# Patient Record
Sex: MALE | Race: WHITE
[De-identification: names, ages, dates, MRNs, and addresses within clinical notes are randomized per-mention and may not be internally consistent; named-entity substitution may affect disease eponyms.]

---

## 2018-03-10 ENCOUNTER — HOSPITAL ENCOUNTER (EMERGENCY)
Dept: HOSPITAL 39 - ER | Age: 53
Discharge: HOME | End: 2018-03-10
Payer: MEDICARE

## 2018-03-10 VITALS — OXYGEN SATURATION: 96 %

## 2018-03-10 VITALS — TEMPERATURE: 98.6 F | DIASTOLIC BLOOD PRESSURE: 79 MMHG | SYSTOLIC BLOOD PRESSURE: 115 MMHG

## 2018-03-10 DIAGNOSIS — S60.424A: Primary | ICD-10-CM
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
## 2019-01-12 ENCOUNTER — HOSPITAL ENCOUNTER (INPATIENT)
Dept: HOSPITAL 39 - ER | Age: 54
LOS: 4 days | Discharge: HOME | DRG: 299 | End: 2019-01-16
Attending: NURSE PRACTITIONER | Admitting: NURSE PRACTITIONER
Payer: COMMERCIAL

## 2019-01-12 DIAGNOSIS — Z88.5: ICD-10-CM

## 2019-01-12 DIAGNOSIS — I82.441: Primary | ICD-10-CM

## 2019-01-12 DIAGNOSIS — I82.811: ICD-10-CM

## 2019-01-12 DIAGNOSIS — L03.115: ICD-10-CM

## 2019-01-12 DIAGNOSIS — Z79.01: ICD-10-CM

## 2019-01-12 DIAGNOSIS — Z86.711: ICD-10-CM

## 2019-01-12 DIAGNOSIS — I26.99: ICD-10-CM

## 2019-01-12 DIAGNOSIS — Z86.73: ICD-10-CM

## 2019-01-12 DIAGNOSIS — E11.621: ICD-10-CM

## 2019-01-12 DIAGNOSIS — D68.59: ICD-10-CM

## 2019-01-12 DIAGNOSIS — I82.411: ICD-10-CM

## 2019-01-12 DIAGNOSIS — I69.844: ICD-10-CM

## 2019-01-12 NOTE — ED.PDOC
History of Present Illness





- General


Chief Complaint: Skin/Abrasion/Tear


Stated Complaint: open sore to Rt foot


Time Seen by Provider: 01/12/19 22:24


Source: patient


Exam Limitations: no limitations





- History of Present Illness


Initial Comments: 


patient comes in with diabetic foot ulcer.  Patient has 3 days ago that his 

right leg started swelling.  Since then patient has had a small circular lesion 

on the right upper portion of his foot that has become an ulcer.  Patient denies

any fever, chills, nausea or vomiting.  The lesion does hurt and has been 

worsening with pain on his entire right side. He's never had this before.  He is

on blood thinner as he had pulmonary embolus and intra-abdominal clots in the 

past.  Patient states his past medical history is significant for CVA, diabetes,

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





Timing/Duration: getting worse


Severity: moderate


Improving Factors: nothing


Worsening Factors: nothing


Associated Symptoms: denies symptoms


Allergies/Adverse Reactions: 


Allergies





Codeine Adverse Reaction (Mild, Verified 03/10/18 02:45)


   Rash








Home Medications: 


Ambulatory Orders





Insulin Degludec [Tresiba Flextouch] See Protocol SC DAILY 01/12/19 


Sertraline HCl [Zoloft] 25 mg PO DAILY 01/12/19 











Review of Systems





- Review of Systems


Constitutional: States: no symptoms reported, malaise.  Denies: chills, fever


EENTM: States: no symptoms reported.  Denies: eye pain, ear pain, nose pain, 

nose congestion


Respiratory: States: no symptoms reported.  Denies: cough, short of breath


Cardiology: States: no symptoms reported.  Denies: chest pain, edema, 

palpitations


Gastrointestinal/Abdominal: States: no symptoms reported.  Denies: abdominal 

pain, diarrhea, nausea, vomiting


Skin: States: see HPI





Past Medical History (General)





- Patient Medical History


Hx Stroke: Yes - TIA's x 2


Hx Cardiac Disorders: Yes - Hx PE's


Hx Congestive Heart Failure: No


Hx Diabetes: Yes


Hx Cancer: Yes - Salivary gland


Hx MRSA: No


Surgical History: cancer surgery, other





- Vaccination History


Hx Tetanus, Diphtheria Vaccination: Yes


Hx Influenza Vaccination: No


Hx Pneumococcal Vaccination: No





- Social History


Hx Tobacco Use: No


Hx Alcohol Use: No


Hx Substance Use: No


Hx Physical Abuse: No


Hx Emotional Abuse: No


Hx Suspected Abuse: No





- Triage Comment


ED Triage Comment: Pt report he noticed a blistered sore to top of his Rt foot, 

came up yesterday. however his Rt lower leg has beomce swollen over last 3-

4days. Pt puntured the blister himself. States it itched and burned. Pt is 

diabetic.





Family Medical History





- Family History


  ** Mother


Family History: Unknown


Living Status: Still Living





Physical Exam





- Physical Exam


General Appearance: Alert, No apparent distress


Eye Exam: bilateral normal


Ears, Nose, Throat: hearing grossly normal, normal ENT inspection, normal 

pharynx


Neck: non-tender, full range of motion, supple, normal inspection


Respiratory: chest non-tender, lungs clear, normal breath sounds, no respiratory

distress


Cardiovascular/Chest: normal peripheral pulses, regular rate, rhythm, no edema, 

no gallop, no murmur


Gastrointestinal/Abdominal: normal bowel sounds, non tender, soft


Extremity: swelling - swelling and erythema of right lower extremity to the 

knee, 1 cm well defined circular ulcer on superior foot with no purulent 

drainage and no fluctulance but positive calor


Neurologic: no motor/sensory deficits, alert, oriented x 3





Progress





- Progress


Progress: 





01/12/19 23:31


I have concern for this being an ulceration from arterial insufficiency.  His 

pulse is diminished and the entire leg is very swollen.  He is on Eliquis but he

has history of recurrent clots and known vascular disease. called for transfer 

and cardiology requested CTA, 


01/13/19 01:06


CTA shows no occlusion, stenosis, will admit here for IV antibiotics.  called NP

Ahmet Estrada and will accept





- Results/Orders


Results/Orders: 





                               Laboratory Results











WBC  7.9 K/mm3 (4.8-10.8)   01/12/19  22:50    


 


RBC  5.27 M/mm3 (4.70-6.10)   01/12/19  22:50    


 


Hgb  15.3 gm/dL (14.0-18.0)   01/12/19  22:50    


 


Hct  45.4 % (42.0-52.0)   01/12/19  22:50    


 


MCV  86.2 fl (80.0-94.0)   01/12/19  22:50    


 


MCH  29.1 pg (27.0-31.0)   01/12/19  22:50    


 


MCHC  33.8 g/dL (33.0-37.0)   01/12/19  22:50    


 


RDW  13.4 % (11.5-14.5)   01/12/19  22:50    


 


Plt Count  205 K/mm3 (130-400)   01/12/19  22:50    


 


MPV  7.7 fl (7.40-10.4)   01/12/19  22:50    


 


Absolute Neuts (auto)  4.10 K/uL (1.8-6.8)   01/12/19  22:50    


 


Absolute Lymphs (auto)  2.70 K/uL (1.0-3.4)   01/12/19  22:50    


 


Absolute Monos (auto)  0.60 K/uL (0.2-0.8)   01/12/19  22:50    


 


Absolute Eos (auto)  0.40 K/uL (0.0-0.4)   01/12/19  22:50    


 


Absolute Basos (auto)  0.10 K/uL (0.0-0.1)   01/12/19  22:50    


 


Neutrophils %  52.2 % (42.0-78.0)   01/12/19  22:50    


 


Lymphocytes %  34.2 % (20.0-50.0)   01/12/19  22:50    


 


Monocytes %  7.0 % (2.0-9.0)   01/12/19  22:50    


 


Eosinophils %  5.6 % (1.0-5.0)  H  01/12/19  22:50    


 


Basophils %  1.0 % (0.0-2.0)   01/12/19  22:50    


 


Sodium  138 mmol/L (135-145)   01/12/19  22:50    


 


Potassium  3.8 mmol/L (3.6-5.0)   01/12/19  22:50    


 


Chloride  104 mmol/L (101-111)   01/12/19  22:50    


 


Carbon Dioxide  26 mmol/L (21-31)   01/12/19  22:50    


 


Anion Gap  11.8  (12-18)  L  01/12/19  22:50    


 


BUN  21 mg/dL (7-18)  H  01/12/19  22:50    


 


Creatinine  0.93 mg/dL (0.6-1.3)   01/12/19  22:50    


 


BUN/Creatinine Ratio  22.6  (10-20)  H  01/12/19  22:50    


 


Random Glucose  380 mg/dL ()  H  01/12/19  22:50    


 


Serum Osmolality  294.3 mOsm/L (275-295)   01/12/19  22:50    


 


Lactic Acid  1.6 mmol/L (0.5-2.2)   01/12/19  11:00    


 


Calcium  9.1 mg/dL (8.4-10.2)   01/12/19  22:50    


 


Total Bilirubin  0.6 mg/dL (0.2-1.0)   01/12/19  22:50    


 


AST  26 IU/L (10-42)   01/12/19  22:50    


 


ALT  30 IU/L (10-60)   01/12/19  22:50    


 


Alkaline Phosphatase  131 IU/L ()  H  01/12/19  22:50    


 


Serum Total Protein  7.1 gm/dL (6.4-8.2)   01/12/19  22:50    


 


Albumin  3.4 g/dl (3.2-5.5)   01/12/19  22:50    


 


Globulin  3.7 gm/dL (2.3-3.5)  H  01/12/19  22:50    


 


Albumin/Globulin Ratio  0.9  (1.1-1.9)  L  01/12/19  22:50    








Patient Name: CLARENCE TOWNSEND


Patient ID: [id number]


Gender: Male


YOB: 1965


Home Phone: [phone]


Referring Physician: VANESA SOLANO


Organization: [facility]


Accession Number: [id number]


Requested Date: January 12, 2019 23:43


Report Status: Final


Requested Procedure: 1


Procedure Description: CTA Lower Extremity


Modality: CT


Findings


Reporting MD: Rolando Deal


Fellow MD: Not available


Dictation Time:


: Not available


Transcription Date:


EXAM DESCRIPTION:


1. CTA of the right lower extremity with contrast.


CLINICAL HISTORY: Swelling, ulcer, arterial insufficiency


COMPARISON: None Available.


TECHNIQUE: Axial CTA images of the right lower extremity obtained


with IV contrast. MIP/third reformatted images available.


DLP: 1714.26 mGycm


FINDINGS:


CTA: The right lower extremity arterial system is visualized from


the infrarenal abdominal aorta through the foot. The visualized


portions of the abdominal aorta have normal caliber. The origin


of the inferior mesenteric artery is widely patent. There is


in-line flow from the infrarenal abdominal aorta into the common


and external iliac arteries bilaterally. The internal iliac


arteries are patent. There is in-line flow from the right common


femoral through the superficial femoral and popliteal arteries.


There is three-vessel runoff to the right foot. The profunda


femoral artery is patent. No evidence of flow-limiting stenosis,


dissection, or occlusion in the right lower extremity.


Bones: No acute osseous abnormality identified. Postoperative


change of the knee. No acute fracture.


Soft tissues: No abnormalities in the visualized portions of the


abdominal or pelvic soft tissues. Specifically, no dilated loops


of visualized large or small bowel. Normal appendix. No


abnormalities of the visualized portions of the kidney or liver.


Urinary bladder and prostate are unremarkable. No lymphadenopathy


or free pelvic fluid identified. No definite free intraperitoneal


air.


There is edema throughout the subcutaneous soft tissues of the


right leg extending to the foot. No subcutaneous air noted.


IMPRESSION:


[facility]


[address] [place] [address], [address] [place], [place]


T [phone] F [phone]


[email or web address] - Report exported on Sun, Jan 13, 2019 01:03:02 -0600 

- Page 2 of 2


1. No evidence of stenosis, occlusion, or dissection in the right


lower extremity arterial system.


2. Diffuse edema throughout the subcutaneous soft tissues of the


right lower extremity extending to the foot. Correlation with


venous duplex ultrasound of the right lower extremity may be


helpful.


This exam was performed according to our departmental


dose-optimization program, which includes automated exposure


control, adjustment of the mA and/or kV according to patient size


and/or use of iterative reconstruction technique





Departure





- Departure


Clinical Impression: 


Cellulitis


Qualifiers:


 Site of cellulitis: extremity Site of cellulitis of extremity: lower extremity 

Laterality: right Qualified Code(s): L03.115 - Cellulitis of right lower limb





Disposition: Admit Patient


Condition: Fair


Departure Forms:  ED Discharge - Pt. Copy, Patient Portal Self Enrollment


Instructions:  DI for Abrasion


Home Medications: 


Ambulatory Orders





Insulin Degludec [Tresiba Flextouch] See Protocol SC DAILY 01/12/19 


Sertraline HCl [Zoloft] 25 mg PO DAILY 01/12/19 











Decision To Admit





- Decistion To Admit


Decision to Admit Reason: Admit from ER


Decision to Admit Date: 01/13/19


Decision to Admit Time: 01:08

## 2019-01-13 RX ADMIN — SODIUM CHLORIDE AND POTASSIUM CHLORIDE PRN MLS/HR: 4.5; 1.49 INJECTION, SOLUTION INTRAVENOUS at 03:36

## 2019-01-13 RX ADMIN — INSULIN DETEMIR SCH UNITS: 100 INJECTION, SOLUTION SUBCUTANEOUS at 22:57

## 2019-01-13 RX ADMIN — VANCOMYCIN HYDROCHLORIDE SCH MLS/HR: 500 INJECTION, POWDER, LYOPHILIZED, FOR SOLUTION INTRAVENOUS at 09:21

## 2019-01-13 RX ADMIN — ONDANSETRON PRN MG: 2 INJECTION INTRAMUSCULAR; INTRAVENOUS at 03:10

## 2019-01-13 RX ADMIN — INSULIN LISPRO SCH UNITS: 100 INJECTION, SOLUTION INTRAVENOUS; SUBCUTANEOUS at 11:49

## 2019-01-13 RX ADMIN — VANCOMYCIN HYDROCHLORIDE SCH MLS/HR: 500 INJECTION, POWDER, LYOPHILIZED, FOR SOLUTION INTRAVENOUS at 17:22

## 2019-01-13 RX ADMIN — MEROPENEM SCH MLS/HR: 1 INJECTION, POWDER, FOR SOLUTION INTRAVENOUS at 06:17

## 2019-01-13 RX ADMIN — INSULIN LISPRO SCH UNITS: 100 INJECTION, SOLUTION INTRAVENOUS; SUBCUTANEOUS at 17:24

## 2019-01-13 RX ADMIN — CEFTRIAXONE SCH MLS/HR: 1 INJECTION, POWDER, FOR SOLUTION INTRAMUSCULAR; INTRAVENOUS at 21:32

## 2019-01-13 RX ADMIN — APIXABAN SCH MG: 2.5 TABLET, FILM COATED ORAL at 21:07

## 2019-01-13 RX ADMIN — INSULIN LISPRO SCH UNITS: 100 INJECTION, SOLUTION INTRAVENOUS; SUBCUTANEOUS at 17:23

## 2019-01-13 RX ADMIN — SODIUM CHLORIDE AND POTASSIUM CHLORIDE PRN MLS/HR: 4.5; 1.49 INJECTION, SOLUTION INTRAVENOUS at 14:15

## 2019-01-13 RX ADMIN — INSULIN LISPRO SCH UNITS: 100 INJECTION, SOLUTION INTRAVENOUS; SUBCUTANEOUS at 21:54

## 2019-01-13 RX ADMIN — INSULIN LISPRO SCH UNITS: 100 INJECTION, SOLUTION INTRAVENOUS; SUBCUTANEOUS at 07:11

## 2019-01-13 RX ADMIN — MEROPENEM SCH MLS/HR: 1 INJECTION, POWDER, FOR SOLUTION INTRAVENOUS at 14:14

## 2019-01-13 RX ADMIN — ONDANSETRON PRN MG: 2 INJECTION INTRAMUSCULAR; INTRAVENOUS at 07:37

## 2019-01-13 RX ADMIN — ACETAMINOPHEN PRN MG: 325 TABLET ORAL at 07:18

## 2019-01-13 RX ADMIN — INSULIN LISPRO SCH UNITS: 100 INJECTION, SOLUTION INTRAVENOUS; SUBCUTANEOUS at 11:50

## 2019-01-13 NOTE — CT
EXAM DESCRIPTION:

1. CTA of the right lower extremity with contrast.



CLINICAL HISTORY: Swelling, ulcer, arterial insufficiency



COMPARISON: None Available.



TECHNIQUE: Axial CTA images of the right lower extremity obtained

with IV contrast. MIP/third reformatted images available.



DLP: 1714.26 mGycm



FINDINGS: 



CTA: The right lower extremity arterial system is visualized from

the infrarenal abdominal aorta through the foot. The visualized

portions of the abdominal aorta have normal caliber. The origin

of the inferior mesenteric artery is widely patent. There is

in-line flow from the infrarenal abdominal aorta into the common

and external iliac arteries bilaterally. The internal iliac

arteries are patent. There is in-line flow from the right common

femoral through the superficial femoral and popliteal arteries.

There is three-vessel runoff to the right foot. The profunda

femoral artery is patent. No evidence of flow-limiting stenosis,

dissection, or occlusion in the right lower extremity.



Bones: No acute osseous abnormality identified. Postoperative

change of the knee. No acute fracture.



Soft tissues: No abnormalities in the visualized portions of the

abdominal or pelvic soft tissues. Specifically, no dilated loops

of visualized large or small bowel. Normal appendix. No

abnormalities of the visualized portions of the kidney or liver.

Urinary bladder and prostate are unremarkable. No lymphadenopathy

or free pelvic fluid identified. No definite free intraperitoneal

air.



There is edema throughout the subcutaneous soft tissues of the

right leg extending to the foot. No subcutaneous air noted.







IMPRESSION: 



1. No evidence of stenosis, occlusion, or dissection in the right

lower extremity arterial system.



2. Diffuse edema throughout the subcutaneous soft tissues of the

right lower extremity extending to the foot. Correlation with

venous duplex ultrasound of the right lower extremity may be

helpful.



This exam was performed according to our departmental

dose-optimization program, which includes automated exposure

control, adjustment of the mA and/or kV according to patient size

and/or use of iterative reconstruction technique.



Electronically signed by:  Rolando Deal  1/13/2019 12:57

AM CST Workstation: [id number]
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
## 2019-01-14 RX ADMIN — INSULIN LISPRO SCH UNITS: 100 INJECTION, SOLUTION INTRAVENOUS; SUBCUTANEOUS at 07:50

## 2019-01-14 RX ADMIN — APIXABAN SCH MG: 2.5 TABLET, FILM COATED ORAL at 20:55

## 2019-01-14 RX ADMIN — ACETAMINOPHEN PRN MG: 325 TABLET ORAL at 22:24

## 2019-01-14 RX ADMIN — CEFTRIAXONE SCH MLS/HR: 1 INJECTION, POWDER, FOR SOLUTION INTRAMUSCULAR; INTRAVENOUS at 21:48

## 2019-01-14 RX ADMIN — INSULIN DETEMIR SCH UNITS: 100 INJECTION, SOLUTION SUBCUTANEOUS at 09:06

## 2019-01-14 RX ADMIN — VANCOMYCIN HYDROCHLORIDE SCH MLS/HR: 500 INJECTION, POWDER, LYOPHILIZED, FOR SOLUTION INTRAVENOUS at 02:33

## 2019-01-14 RX ADMIN — INSULIN LISPRO SCH UNITS: 100 INJECTION, SOLUTION INTRAVENOUS; SUBCUTANEOUS at 07:51

## 2019-01-14 RX ADMIN — Medication PRN ML: at 21:48

## 2019-01-14 RX ADMIN — INSULIN LISPRO SCH UNITS: 100 INJECTION, SOLUTION INTRAVENOUS; SUBCUTANEOUS at 12:22

## 2019-01-14 RX ADMIN — SERTRALINE HYDROCHLORIDE SCH MG: 50 TABLET ORAL at 09:08

## 2019-01-14 RX ADMIN — VANCOMYCIN HYDROCHLORIDE SCH MLS/HR: 500 INJECTION, POWDER, LYOPHILIZED, FOR SOLUTION INTRAVENOUS at 09:08

## 2019-01-14 RX ADMIN — VANCOMYCIN HYDROCHLORIDE SCH MLS/HR: 500 INJECTION, POWDER, LYOPHILIZED, FOR SOLUTION INTRAVENOUS at 17:34

## 2019-01-14 RX ADMIN — INSULIN LISPRO SCH UNITS: 100 INJECTION, SOLUTION INTRAVENOUS; SUBCUTANEOUS at 17:34

## 2019-01-14 RX ADMIN — INSULIN DETEMIR SCH UNITS: 100 INJECTION, SOLUTION SUBCUTANEOUS at 20:56

## 2019-01-14 RX ADMIN — INSULIN LISPRO SCH UNITS: 100 INJECTION, SOLUTION INTRAVENOUS; SUBCUTANEOUS at 12:23

## 2019-01-14 RX ADMIN — APIXABAN SCH MG: 2.5 TABLET, FILM COATED ORAL at 09:08

## 2019-01-14 RX ADMIN — INSULIN LISPRO SCH UNITS: 100 INJECTION, SOLUTION INTRAVENOUS; SUBCUTANEOUS at 20:56

## 2019-01-14 NOTE — US
EXAM DESCRIPTION: 

Venous,Lower Extremity LT: ULTRASOUND.



CLINICAL HISTORY: 

Hx Protein C def; Edema to entire RLE



COMPARISON: 

Screening examination of right lower extremity on the same visit.



TECHNIQUE: 

Gray-scale and doppler sonographic evaluation of the deep venous

system of the left lower extremity.



FINDINGS: 

Doppler evaluation shows normal color flow and normal phasicity

and augmentation of the left common femoral vein, deep femoral

vein, femoral vein, popliteal vein, greater saphenous vein,

peroneal, and posterior tibial vein. The left lower extremity

deep veins were completely compressible; normal occlusion with

transducer pressure. Gray-scale survey showed no echogenic

thrombus within these veins. 



IMPRESSION: 

1.  Duplex ultrasound evaluation of the left lower extremity deep

venous system showing no evidence of thrombosis.

2.  The sonographer, BOSTON Joshi, notified Mr. Ahmet Estrada, family nurse practitioner and hospitalist, of these

findings at approximately 1115 hours today.



Electronically signed by:  Wilder Spangler MD  1/14/2019 12:39 PM

CST Workstation: [id number]

## 2019-01-14 NOTE — CT
EXAM: CTA Chest



CLINICAL INDICATION: 53-year-old male with large DVT in the RIGHT

leg. History of pulmonary embolus.



COMPARISON: None.



EXAMINATION: CT angiography of the pulmonary arteries was

performed following intravenous administration of contrast.

Coronal and bilateral oblique maximum intensity projections

(MIPS) were created. This exam was performed according to our

departmental dose optimization program which includes use of

automated exposure control, adjustment of the mA and/or kV

according to patient size and/or use of iterative reconstruction

technique.



FINDINGS:



Chest:



Evaluation through the lungs reveals no focal opacity, pleural

effusion or pneumothorax. There is minimal dependent basilar

atelectasis and scarring. The tracheobronchial airways are

patent. No significant mediastinal or axillary lymphadenopathy by

CT measurement criteria.



Limited evaluation of the upper abdomen shows no acute

intra-abdominal abnormalities. 



The osseous structures are within normal limits.



CT angiography: Diagnostic CT angiography of the pulmonary

arteries with intraluminal filling defects compatible with

suggest pulmonary arterial emboli present within the posterior

bilateral subsegmental descending pulmonary arteries.



IMPRESSION:



1. Diagnostic pulmonary angiography with findings compatible with

bilateral pulmonary arterial emboli.

2. The lungs are clear without focal opacity, pleural effusion or

pneumothorax.



Electronically signed by:  Elaine Soto MD  1/14/2019 7:07 PM CST

Workstation: [id number]

## 2019-01-14 NOTE — US
EXAM DESCRIPTION: 

Venous,Lower Extremity RT: ULTRASOUND.



CLINICAL HISTORY: 

Hx Protein C def; Edema to entire RLE



COMPARISON: 

Self-exam examination of left lower extremity on this visit.



TECHNIQUE: 

Gray-scale and doppler sonographic evaluation of the deep venous

system of the right lower extremity.



FINDINGS: 

Echogenic thrombus involving most of the right common femoral

vein, deep femoral vein, femoral vein, popliteal vein, and

greater saphenous vein,. Also right peroneal vein and right

posterior tibial vein. Minimal to no color flow or venous

waveform. These veins are not compressible with transducer

pressure. Lesser saphenous vein below the knee is patent. 



IMPRESSION: 

1.  Duplex ultrasound evaluation of the right lower extremity

deep venous system showing extensive thrombosis. From the right

posterior tibial vein to the proximal right common femoral vein.

2.  No thrombus in the lesser saphenous vein below the knee,

which is a superficial vein. Thrombus in the greater saphenous

vein above-the-knee, which is a superficial vein.

3.  The sonographer, BOSTON Joshi, notified Mr. Ahmet Estrada, family nurse practitioner and hospitalist, of these

findings at approximately 1115 hours today.



Electronically signed by:  Wilder Spangler MD  1/14/2019 12:35 PM

CST Workstation: [id number]

## 2019-01-14 NOTE — PN
DATE:  01/14/19



SUPERVISING PHYSICIAN:  Mikel Metzger M.D.



SUBJECTIVE:  The patient notes that he feels like his leg is much less swollen 
with it being elevated.  He said the pain is improving significantly.  He has 
had no shortness of breath.  No nausea or vomiting.  No chest pains.  I did talk
with him that finally I was able to touch base with his oncologist, Dr. Montes in 
[place].  Dr. Montes recommended that we continue with Eliquis and once the 
patient can discharge he will followup this week in his office.



OBJECTIVE:  VITAL SIGNS: Temperature 98, pulse 79, blood pressure 150/87, 
respirations 18, satting 95% on room air.  Weight is not accurate at all.  He 
has had 2 bowel movements.  GENERAL: The patient appears to be alert in no acute
distress with his leg elevated on a pillow.  CHEST: Lungs remain clear to 
auscultation.  HEART: Regular rate and rhythm.  ABDOMEN: Soft, non-tender.  
Positive bowel sounds.  EXTREMITIES: Right leg continues to be swollen but it is
significantly decreasing.  There still continues to be redness to the dime-sized
sore on top of his foot but it is showing good improvement and response to 
therapy.  Pulses are Dopplerable and palpable at 1+ bilaterally.  NEUROLOGIC: He
is alert and oriented times three.  No reported paresthesias.  



RADIOLOGY:  He had Doppler studies of both lower extremities.  The Doppler of 
the right leg per radiology interpretation showed a duplex ultrasound evaluation
of the right lower extremity deep venous system showing extensive thrombosis 
with a right posterior tibial vein and proximal right common femoral vein.  
There is no thrombus in the lesser saphenous vein below the knee with was a 
superficial vein.  Thrombus in the greater saphenous vein above the knee, which 
is a superficial vein.  There was no deep venous thrombus seen on the left leg.



ASSESSMENT: 

1.   Cellulitis of the right lower leg from the thigh to the toes due to an 
extensive thrombus

      as noted on ultrasound with the patient currently on Eliquis.

2.   Diabetic ulcer to the superior portion of the foot showing good improvement
with

      antibiotics.

3.   History of protein C deficiency contributing to his DVT on #1 with the 
patient on 

      chronic Eliquis therapy but most likely has not been compliant with that 
therapy.

4.   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

      within the last 10 years secondary to protein C deficiency.

5.   Diabetes mellitus type 2 on insulin.

6.   History of transient ischemic attacks in 2011 in November and December

      with reported left sided leg weakness.



PLAN:  I did talk with Dr. Montes today.  He recommended continue with Eliquis and 
once the patient is able to discharge, to followup with his office this week.  
He is now on Eliquis 10 mg twice daily.  He continues on vancomycin and now is 
on Rocephin, and is showing good response to that treatment.  Will continue to 
monitor and treat as needed until we can transition to outpatient management.  
Until then, will continue with current therapy.



#[id number]

[facility]

## 2019-01-15 RX ADMIN — INSULIN LISPRO SCH UNITS: 100 INJECTION, SOLUTION INTRAVENOUS; SUBCUTANEOUS at 08:22

## 2019-01-15 RX ADMIN — Medication PRN ML: at 00:59

## 2019-01-15 RX ADMIN — GENTAMICIN SULFATE SCH APPLIC: 1 CREAM TOPICAL at 16:48

## 2019-01-15 RX ADMIN — APIXABAN SCH MG: 2.5 TABLET, FILM COATED ORAL at 20:27

## 2019-01-15 RX ADMIN — INSULIN LISPRO SCH UNITS: 100 INJECTION, SOLUTION INTRAVENOUS; SUBCUTANEOUS at 16:46

## 2019-01-15 RX ADMIN — ALUMINUM ZIRCONIUM TRICHLOROHYDREX GLY SCH MG: 0.2 STICK TOPICAL at 09:22

## 2019-01-15 RX ADMIN — SERTRALINE HYDROCHLORIDE SCH MG: 50 TABLET ORAL at 08:35

## 2019-01-15 RX ADMIN — APIXABAN SCH MG: 2.5 TABLET, FILM COATED ORAL at 08:36

## 2019-01-15 RX ADMIN — CEFTRIAXONE SCH MLS/HR: 1 INJECTION, POWDER, FOR SOLUTION INTRAMUSCULAR; INTRAVENOUS at 20:29

## 2019-01-15 RX ADMIN — VANCOMYCIN HYDROCHLORIDE SCH MLS/HR: 500 INJECTION, POWDER, LYOPHILIZED, FOR SOLUTION INTRAVENOUS at 08:37

## 2019-01-15 RX ADMIN — INSULIN LISPRO SCH UNITS: 100 INJECTION, SOLUTION INTRAVENOUS; SUBCUTANEOUS at 11:43

## 2019-01-15 RX ADMIN — GENTAMICIN SULFATE SCH APPLIC: 1 CREAM TOPICAL at 20:28

## 2019-01-15 RX ADMIN — INSULIN LISPRO SCH UNITS: 100 INJECTION, SOLUTION INTRAVENOUS; SUBCUTANEOUS at 11:44

## 2019-01-15 RX ADMIN — INSULIN DETEMIR SCH UNITS: 100 INJECTION, SOLUTION SUBCUTANEOUS at 21:00

## 2019-01-15 RX ADMIN — VANCOMYCIN HYDROCHLORIDE SCH MLS/HR: 500 INJECTION, POWDER, LYOPHILIZED, FOR SOLUTION INTRAVENOUS at 17:03

## 2019-01-15 RX ADMIN — INSULIN LISPRO SCH UNITS: 100 INJECTION, SOLUTION INTRAVENOUS; SUBCUTANEOUS at 21:00

## 2019-01-15 RX ADMIN — INSULIN LISPRO SCH UNITS: 100 INJECTION, SOLUTION INTRAVENOUS; SUBCUTANEOUS at 08:21

## 2019-01-15 RX ADMIN — VANCOMYCIN HYDROCHLORIDE SCH MLS/HR: 500 INJECTION, POWDER, LYOPHILIZED, FOR SOLUTION INTRAVENOUS at 00:58

## 2019-01-15 RX ADMIN — INSULIN DETEMIR SCH UNITS: 100 INJECTION, SOLUTION SUBCUTANEOUS at 08:23

## 2019-01-15 RX ADMIN — ACETAMINOPHEN PRN MG: 325 TABLET ORAL at 07:41

## 2019-01-15 NOTE — PN
DATE:  01/15/19



SUPERVISING PHYSICIAN:  Mikel Metzger M.D.



SUBJECTIVE:  The patient continues to keep his leg elevated.  Notes that the 
pain is much less than it was on admission and actually the size has decreased 
significantly.  He has had some nausea associated with morphine.  I told him we 
would try some Toradol.  Other than the small headache he has had no complaints 
of shortness of breath, chest pains or any other complaints.  I did discuss the 
finding of the CT of the chest that he did again have pulmonary embolisms 
bilaterally.  



OBJECTIVE:  VITAL SIGNS: Temperature 98.1, pulse 69, blood pressure 127/81, 
respirations 18, satting 92% on room air at rest.  I's and O's show a negative 
balance of 850 with 2100 in, 2950 out.  Weight is 118.8 kg.  GENERAL: The 
patient is resting in bed with his right leg elevated.  Appears to be 
comfortable, in no acute distress.  He is alert.  CHEST: Lungs were clear to 
auscultation.  HEART: Regular rate and rhythm.  ABDOMEN: Soft, non-tender.  
Positive bowel sounds.  EXTREMITIES: Right leg continues to show edema from the 
peroneal area to the distal foot, but significantly decreased in size and 
diameter and there is no notable redness today.  The leg is much less painful to
palpation.  The ulceration or blister that is on top of the foot is now scabbed 
over and is showing to be healing well with minimal erythema.  No drainage.  
Pulse continues to be palpable and is marked with Doppler study.  Left leg 
remains unremarkable with good pulses.  NEUROLOGIC: He is alert and oriented 
times three.



LABORATORY:  Chemistries continue to show to be within normal limits with a BUN 
of 12, creatinine 0.73.  Blood sugars have been in between 151 and 306.  C 
reactive protein is now down to 0.7 from 1.3 on admission and calcium is 8.8.  
He had 1 trough vancomycin at 11.8 on the 14th.



MICROBIOLOGY:  Wound culture at 24 hours showed no growth.  Blood cultures 
remain negative at 48 hours.



RADIOLOGY:  No additional radiographic studies other than the CT of the chest 
that was done late yesterday afternoon that showed diabetic pulmonary 
angiography with findings compatible with bilateral pulmonary arterial emboli.  
Lungs were chest without any focal opacities, pleural effusion.  No 
pneumothorax.  The pulmonary arteries with the intraluminal filling defects are 
compatible again with suggested pulmonary arterial emboli present within the 
posterior bilateral subsegmental descending pulmonary arteries.  



ASSESSMENT: 

1.   Cellulitis of the right leg from the thigh to the foot with an extensive 
thrombus

      noted on ultrasound and the patient on Eliquis therapy with the patient 
having a

      history of past DVTs and a protein C deficiency.

2.   Bilateral pulmonary arterial emboli present within the posterior bilateral 
subsegmental

      descending pulmonary arteries with the patient being hemodynamically 
stable and

      without any chest pains or shortness of breath, and currently on Eliquis.

3.   Diabetic ulcer to the superior portion of the right foot continuing to show
good 

      improvement with parenteral antibiotics and topical antibiotics with 
Gentamicin.

4.   History of protein C deficiency contributing to his DVT with the patient 
having been

      on chronic therapy with Eliquis but more likely has missed doses 
periodically and

      not been totally compliant with therapy.

5.   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

      within the last 10 years secondary to protein C deficiency.

6.   Diabetes mellitus type 2 on insulin showing to be stable.

7.   History of transient ischemic attacks in 2011 in November and December

      with reported left sided weakness of the left leg.



PLAN:  Again, I talked to Hematology yesterday, Dr. Montes, in [place] with the 
[facility].  He recommends the patient if able to be discharged 
tomorrow so that he could followup with his practice group in [place] at 
11:15.  He recommends continued Eliquis and dosing at 10 mg twice daily as 
indicated for reinitiation of Eliquis, and duration of 10 mg to be for 7 days, 
and then to transition to 5 mg b.i.d.  He has been started on Eliquis on day 2 
of admission.  He is concerned that he will have issues with transportation 
driving to [place] because he cannot keep his leg up in the car.  I have 
encouraged him that he can ride as a passenger and to stop periodically to 
stretch his legs and continue with his medications as directed.  Again, will 
hopefully be able to discharge tomorrow with continued antibiotic coverage with 
at least doxycycline.  I would suggest probably at least a 10 day total course 
and possible a cephalosporin given that he did have a significant cellulitis 
involving the entire leg and responded well to therapy on Rocephin and 
vancomycin.  He does live local and if  there is a need to continued IV 
antibiotics, the discussion needs to be had tomorrow so that he can either get 
that done in [place] or make arrangements to have done here, however I have 
not touched base with Dr. Valentine as he has responded well to treatment, therefore
I will leave that to Natalee Noguera and Dr. Metzger to determine at discharge.



#[id number]

[facility]

## 2019-01-16 VITALS — OXYGEN SATURATION: 93 % | TEMPERATURE: 97.5 F | SYSTOLIC BLOOD PRESSURE: 125 MMHG | DIASTOLIC BLOOD PRESSURE: 76 MMHG

## 2019-01-16 RX ADMIN — APIXABAN SCH MG: 2.5 TABLET, FILM COATED ORAL at 08:55

## 2019-01-16 RX ADMIN — Medication PRN ML: at 09:28

## 2019-01-16 RX ADMIN — VANCOMYCIN HYDROCHLORIDE SCH: 500 INJECTION, POWDER, LYOPHILIZED, FOR SOLUTION INTRAVENOUS at 17:24

## 2019-01-16 RX ADMIN — GENTAMICIN SULFATE SCH APPLIC: 1 CREAM TOPICAL at 08:56

## 2019-01-16 RX ADMIN — INSULIN LISPRO SCH UNITS: 100 INJECTION, SOLUTION INTRAVENOUS; SUBCUTANEOUS at 16:57

## 2019-01-16 RX ADMIN — GENTAMICIN SULFATE SCH: 1 CREAM TOPICAL at 17:24

## 2019-01-16 RX ADMIN — GENTAMICIN SULFATE SCH APPLIC: 1 CREAM TOPICAL at 12:50

## 2019-01-16 RX ADMIN — VANCOMYCIN HYDROCHLORIDE SCH MLS/HR: 500 INJECTION, POWDER, LYOPHILIZED, FOR SOLUTION INTRAVENOUS at 09:28

## 2019-01-16 RX ADMIN — Medication PRN ML: at 11:43

## 2019-01-16 RX ADMIN — SERTRALINE HYDROCHLORIDE SCH MG: 50 TABLET ORAL at 08:55

## 2019-01-16 RX ADMIN — INSULIN LISPRO SCH UNITS: 100 INJECTION, SOLUTION INTRAVENOUS; SUBCUTANEOUS at 07:45

## 2019-01-16 RX ADMIN — ALUMINUM ZIRCONIUM TRICHLOROHYDREX GLY SCH MG: 0.2 STICK TOPICAL at 08:55

## 2019-01-16 RX ADMIN — INSULIN LISPRO SCH UNITS: 100 INJECTION, SOLUTION INTRAVENOUS; SUBCUTANEOUS at 11:37

## 2019-01-16 RX ADMIN — INSULIN DETEMIR SCH UNITS: 100 INJECTION, SOLUTION SUBCUTANEOUS at 08:58

## 2019-01-16 RX ADMIN — INSULIN LISPRO SCH UNITS: 100 INJECTION, SOLUTION INTRAVENOUS; SUBCUTANEOUS at 16:58

## 2019-01-16 RX ADMIN — VANCOMYCIN HYDROCHLORIDE SCH MLS/HR: 500 INJECTION, POWDER, LYOPHILIZED, FOR SOLUTION INTRAVENOUS at 01:14

## 2019-01-28 NOTE — DS
SUPERVISING PHYSICIAN:  Mikel Metzger M.D.



DISCHARGE DIAGNOSIS: 

1.   Cellulitis of the right leg from the thigh to the foot with an extensive 
thrombus

      noted on ultrasound and the patient on Eliquis therapy with the patient 
having a

      history of past DVTs and a protein C deficiency.

2.   Bilateral pulmonary arterial emboli present within the posterior bilateral 
subsegmental

      descending pulmonary arteries with the patient being hemodynamically 
stable and

      without any chest pains or shortness of breath, and currently on Eliquis.

3.   Diabetic ulcer to the superior portion of the right foot continuing to show
good 

      improvement with parenteral antibiotics and topical antibiotics with 
Gentamicin.

4.   History of protein C deficiency contributing to his DVT with the patient 
having been

      on chronic therapy with Eliquis but more likely has missed doses 
periodically and

      not been totally compliant with therapy.

5.   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

      within the last 10 years secondary to protein C deficiency.

6.   Diabetes mellitus type 2 on insulin showing to be stable.

7.   History of transient ischemic attacks in 2011 in November and December

      with reported left sided weakness of the left leg.



HISTORY OF PRESENT ILLNESS:  Mr. Irby is a 53 year-old  male 
patient that presented to the Emergency Room with a diabetic ulcer on top of his
foot and swelling from his thigh down.  His wife and the patient note that the 
patient had started having some swelling in his right leg about 5 days previous 
to coming to the E. R.  He first noted that the swelling was in the thigh and 
then 2 days after the swelling started he developed a blister just on the top 
part of his foot that he ended up opening up and draining.  The patient had been
trying to treat the swelling with an Iceman but was not having any success.  He 
does have a significant history of protein C deficiency with a large blood clot 
on the left leg and pulmonary embolisms in 2011.  He is on Eliquis chronically 
but has not had a dose in 3 days, according to the wife.  She and the patient 
both note that the swelling started while he was still on the Eliquis.  The 
patient was denying any shortness of breath, chest pain, fevers, chills, nausea 
or vomiting.  In the E. R. given the degree of swelling, there were concerns for
possible arterial occlusion and an attempt was made to transfer the patient to 
[facility] by Dr. Lamb.  It was recommended by the 
cardiologist on call  that he at least get a CTA of the leg and after that was 
completed there was no evidence of stenosis, occlusion or dissection to the 
right lower extremity arterial system.  Based off the findings of lack of 
arterial occlusion the patient is now going to be admitted to the 
Medical/Surgical floor for ongoing treatment of swelling and cellulitis of the 
right leg with concerns for possible DVT as well.  He was started on antibiotics
with Rocephin and vancomycin.  Laboratory studies showed that his white count 
was normal at 7,900 without a left shift and sed rate was normal at 14.  Lactic 
acid was normal at 1.6.  C reactive protein was slightly elevated at 1.3.  Blood
cultures were collected as well as wound culture of the right foot.  The patient
was started on antibiotics and now is admitted to the Medical/Surgical floor.  
He is admitted in stable condition.



HOSPITAL COURSE:  The patient was admitted to the hospital for ongoing treatment
of cellulitis with concerns for DVT.  An ultrasound of the leg was ordered.  He 
was on Lovenox at 1 mg per kg twice daily.  He was placed on bedrest as well as 
having his leg elevated at all times.  He was started on antibiotics of 
vancomycin and Meropenem as well as on insulin sliding scale protocol.  He does 
have a protein C deficiency that is followed by Dr. Rodriguez in [place] and he 
was encouraged to followup with him after discharge, and encouraged to get a 
primary care provider in this area.   was consulted to assist him
in finding one.  He was continued on his antibiotic therapy.  The edema in his 
leg decreased and the pain improved significantly with no shortness of breath.  
No nausea or vomiting.  Dr. Montes who was covering for his oncologist in [place] recommended that he continue with Eliquis.  Eliquis therapy was continued 
after results of his ultrasound showed a DVT on that right leg.  His Lovenox was
discontinued.  There was no evidence of pulmonary embolism.  He was transitioned
off his pain medications and he will followup with the practice group on the 
17th at 11:15.  The Merrem was discontinued and Rocephin was started.  After he 
completed his appointment with his oncologist in [place] it was recommended 
that he followup with Dr. Valentine, Infectious Diseases in [place].  His pain
has been controlled.  His vital signs are within normal limits.  He has been 
afebrile for greater than 24 hours and he will be discharged today in stable 
condition.



LABORATORY:  He had a CBC that was within normal limits and a 14 ESR.  Blood 
sugars ran between 151 and 329.  Electrolytes were within normal limits.  He did
have an elevated C reactive protein on admission of 1.3 and it normalized to 
0.7.  



RADIOLOGY:  His lower extremity CTA showed no evidence of stenosis, occlusion or
dissection in the right lower extremity arterial system with diffuse edema 
throughout the subcutaneous soft tissue of the right lower extremity extending 
to the foot.  Recommended a venous Doppler.  His left venous Doppler showed no 
evidence of thrombosis.  Right lower extremity ultrasound showed right posterior
tibial vein to the proximal right common femoral vein.  His chest CTA showed:  
1) Diagnostic pulmonary angiography with findings compatible with bilateral 
pulmonary arterial emboli.  2) The lungs are clear without focal opacities, 
pleural effusion or pneumothorax.



DISCHARGE PLAN:   The patient will be discharged home in stable condition.  He 
is to followup with his oncologist as previously scheduled and he is to find a 
primary care physician.  Arrangements were made via .  He will be
discharged on his routine medications as well as Eliquis, Cefdinir and 
doxycycline.  He is to return to the hospital or followup with Dr. Montes, Dr. Valentine or return to the E. R. for any problems or complications.  



DISCHARGE MEDICATIONS:

1.   Sertraline.

2.   Tresiba.

3.   Meloxicam.

4.   Cyclobenzaprine.

5.   Eliquis.

6.   Lisinopril.

7.   Align.

8.   Cefdinir.

9.   Doxycycline.



#[id number]

[facility]

## 2020-11-05 ENCOUNTER — HOSPITAL ENCOUNTER (EMERGENCY)
Dept: HOSPITAL 39 - ER | Age: 55
Discharge: HOME | End: 2020-11-05
Payer: COMMERCIAL

## 2020-11-05 VITALS — OXYGEN SATURATION: 94 % | SYSTOLIC BLOOD PRESSURE: 116 MMHG | TEMPERATURE: 97 F | DIASTOLIC BLOOD PRESSURE: 77 MMHG

## 2020-11-05 DIAGNOSIS — Z86.711: ICD-10-CM

## 2020-11-05 DIAGNOSIS — Z88.5: ICD-10-CM

## 2020-11-05 DIAGNOSIS — I10: ICD-10-CM

## 2020-11-05 DIAGNOSIS — Z86.73: ICD-10-CM

## 2020-11-05 DIAGNOSIS — Z79.899: ICD-10-CM

## 2020-11-05 DIAGNOSIS — E11.9: ICD-10-CM

## 2020-11-05 DIAGNOSIS — E07.9: ICD-10-CM

## 2020-11-05 DIAGNOSIS — Z79.4: ICD-10-CM

## 2020-11-05 DIAGNOSIS — Z85.818: ICD-10-CM

## 2020-11-05 DIAGNOSIS — L03.317: Primary | ICD-10-CM

## 2020-11-05 DIAGNOSIS — R56.9: ICD-10-CM

## 2020-11-05 DIAGNOSIS — Z79.01: ICD-10-CM

## 2020-11-05 NOTE — ED.PDOC
History of Present Illness





- General


Chief Complaint: Skin/Abrasion/Tear


Stated Complaint: abscess


Time Seen by Provider: 20 10:33


Source: patient, RN notes reviewed, Vital Signs reviewed, family


Exam Limitations: no limitations





- History of Present Illness


Initial Comments: 





56 yo male with insulin dependent DM comes in with abscess, rash on right 

gluteal area x 4-5 days.  Wife has tried using warm soaks and topical 

antibiotics, but seems to be getting worse so came in.  NO fever. glucose 

running 200s, which is baseline for patient. no recent oral/iv antibiotics. 


Timing/Duration: week, getting worse


Allergies/Adverse Reactions: 


Allergies





Codeine Allergy (Severe, Verified 20 10:51)


   Anaphylaxis





Home Medications: 


Ambulatory Orders





Insulin Degludec [Tresiba Flextouch] 40 unit SC DAILY 19 


Sertraline HCl [Zoloft] 100 mg PO DAILY 19 


Apixaban [Eliquis] 5 mg PO BID 19 


Cyclobenzaprine HCl [Cyclobenzaprine Hydrochlo] 10 mg PO DAILY 19 


Insulin Lispro [Humalog Kwikpen] See Protocol INJ PRN 19 


Meloxicam 15 mg PO DAILY 19 


Lisinopril 5 mg PO DAILY 19 


Apixaban [Eliquis] 5 mg PO BID #39 tab 19 


Bifidobacterium Infantis [Align] 4 mg PO DAILY  cap 19 


Cefdinir 300 mg PO BID #24 capsule 19 


Doxycycline Hyclate 100 mg PO BID #24 cap 19 


Cole/Poly/Hc Otic Susp [Cortisporin Otic Susp] 4 drop LEFT_EAR Q6H #10 days 

20 


Cephalexin Monohydrate [Keflex] 500 mg PO QID #40 cap 20 


Sulfamethoxazole-Trimethoprim [Bactrim Ds 800-160 mg] 1 tab PO BID #20 tab 

20 











Review of Systems





- Review of Systems


Constitutional: Denies: chills, fever


EENTM: Denies: blurred vision, throat pain


Respiratory: Denies: cough, short of breath


Cardiology: Denies: chest pain, palpitations


Gastrointestinal/Abdominal: Denies: abdominal pain, nausea


Genitourinary: Denies: dysuria, frequency


Musculoskeletal: Denies: back pain, joint swelling, muscle pain


Skin: Denies: dryness, lesions


Neurological: Denies: headache, numbness, paresthesia, pre-existing deficit


Endocrine: Denies: unexplained weight gain, unexplained weight loss


Hematologic/Lymphatic: Denies: easy bleeding, easy bruising





Past Medical History (General)





- Patient Medical History


Hx Seizures: Yes


Hx Stroke: Yes


Hx Dementia: No


Hx Asthma: No


Hx of COPD: No


Hx Cardiac Disorders: Yes - Hx PE's


Hx Congestive Heart Failure: No


Hx Pacemaker: No


Hx Hypertension: Yes


Hx Thyroid Disease: Yes


Hx Diabetes: Yes


Hx Gastroesophageal Reflux: No


Hx Renal Disease: No


Hx Cancer: Yes - Salivary gland


Hx of HIV: No


Hx MRSA: No


Surgical History: other





- Vaccination History


Hx Tetanus, Diphtheria Vaccination: Yes


Hx Influenza Vaccination: No


Hx Pneumococcal Vaccination: No





- Social History


Hx Tobacco Use: No


Hx Alcohol Use: No


Hx Substance Use: No


Hx Physical Abuse: No


Hx Emotional Abuse: No


Hx Suspected Abuse: No





Family Medical History





- Family History


  ** Mother


Family History: Unknown


Living Status: Still Living


Hx Family Asthma: No


Hx Family Congestive Heart Failure: No


Hx Family Hypertension: No


Hx Family Stroke: No


Hx Cardiac Disease: No


Hx Family Diabetes: No


Hx Family Cancer: Yes





  ** Father


Living Status: 


Hx Family Asthma: No


Hx Family Congestive Heart Failure: No


Hx Family Hypertension: Yes


Hx Family Stroke: Yes


Hx Cardiac Disease: Yes


Hx Family Diabetes: Yes


Hx Family Cancer: Yes





Physical Exam





- Physical Exam


General Appearance: Alert, Comfortable, No apparent distress, Well Developed, 

Well Groomed, Well Hydrated, Well Nourished


Eyes, Ears, Nose, Throat Exam: PERRL/EOMI, normal ENT inspection


Neck: non-tender, full range of motion, supple


Cardiovascular/Chest: normal peripheral pulses, regular rate, rhythm, no edema, 

no gallop, no JVD, no murmur


Respiratory: chest non-tender, lungs clear, normal breath sounds, no respiratory

distress, no accessory muscle use


Gastrointestinal/Abdominal: normal bowel sounds, non tender, soft


Back Exam: normal inspection, no CVA tenderness


Extremity: normal range of motion, non-tender, normal inspection, no pedal 

edema, no calf tenderness, normal capillary refill


Neurologic: CNs II-XII nml as tested, no motor/sensory deficits, alert, normal 

mood/affect, oriented x 3


Skin Exam: other - 5 x 6 cm area of erythema, induration, exociration, small 

area of fluctance. no drainaged noted. right gluteal region. 





Progress





- Progress


Progress: 





20 11:22





partial ddx considered: cellulitis, abscess, David gangrene.  Due to location

FG rule out





The data reviewed when caring for this patient included: nurse notes, prior 

records, etc. The history and assessments from nurses notes were reviewed and 

considered, and the patient's home medication list was also reviewed and 

considered. My assessment and the results of testing completed here in the ED 

were discussed with the patient/family. All questions were answered, and they 

express understanding of my assessment and the plan. They have been instructed 

to return if their symptoms worsen, and have been asked to follow up with their 

primary care physician to recheck today's presenting complaint. Strict return 

precautions given. I have reviewed medication, benefits, alternatives and side 

effects. Patient decided to proceed with medication.





Lesly Roach DO #[id number]





The center are of fluctuance was small, it was stuck with 18 gauge no purulent 

drainage noted


20 11:24








Departure





- Departure


Clinical Impression: 


Cellulitis


Qualifiers:


 Site of cellulitis: buttock Qualified Code(s): L03.317 - Cellulitis of buttock





Time of Disposition: 11:14


Disposition: Discharge to Home or Self Care


Departure Forms:  ED Discharge - Pt. Copy, Patient Portal Self Enrollment


Instructions:  DI for Abrasion, Cellulitis (Skin Infection), Adult (DC)


Diet: diabetic diet


Prescriptions: 


Sulfamethoxazole-Trimethoprim [Bactrim Ds 800-160 mg] 1 tab PO BID #20 tab


Cephalexin Monohydrate [Keflex] 500 mg PO QID #40 cap


Home Medications: 


Ambulatory Orders





Insulin Degludec [Tresiba Flextouch] 40 unit SC DAILY 19 


Sertraline HCl [Zoloft] 100 mg PO DAILY 19 


Apixaban [Eliquis] 5 mg PO BID 19 


Cyclobenzaprine HCl [Cyclobenzaprine Hydrochlo] 10 mg PO DAILY 19 


Insulin Lispro [Humalog Kwikpen] See Protocol INJ PRN 19 


Meloxicam 15 mg PO DAILY 19 


Lisinopril 5 mg PO DAILY 19 


Apixaban [Eliquis] 5 mg PO BID #39 tab 19 


Bifidobacterium Infantis [Align] 4 mg PO DAILY  cap 19 


Cefdinir 300 mg PO BID #24 capsule 19 


Doxycycline Hyclate 100 mg PO BID #24 cap 19 


Cole/Poly/Hc Otic Susp [Cortisporin Otic Susp] 4 drop LEFT_EAR Q6H #10 days 

20 


Cephalexin Monohydrate [Keflex] 500 mg PO QID #40 cap 20 


Sulfamethoxazole-Trimethoprim [Bactrim Ds 800-160 mg] 1 tab PO BID #20 tab 

20 








Additional Instructions: 


follow up with your doctor in 2-3 days

## 2020-12-29 ENCOUNTER — HOSPITAL ENCOUNTER (EMERGENCY)
Dept: HOSPITAL 39 - ER | Age: 55
Discharge: HOME | End: 2020-12-29
Payer: COMMERCIAL

## 2020-12-29 VITALS — DIASTOLIC BLOOD PRESSURE: 92 MMHG | OXYGEN SATURATION: 96 % | TEMPERATURE: 97.7 F | SYSTOLIC BLOOD PRESSURE: 148 MMHG

## 2020-12-29 DIAGNOSIS — Z79.01: ICD-10-CM

## 2020-12-29 DIAGNOSIS — S60.042A: Primary | ICD-10-CM

## 2020-12-29 DIAGNOSIS — R56.9: ICD-10-CM

## 2020-12-29 DIAGNOSIS — I25.2: ICD-10-CM
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
## 2020-12-29 DIAGNOSIS — Z88.5: ICD-10-CM

## 2020-12-29 DIAGNOSIS — Z79.899: ICD-10-CM

## 2020-12-29 DIAGNOSIS — Z86.73: ICD-10-CM

## 2020-12-29 DIAGNOSIS — E11.9: ICD-10-CM

## 2020-12-29 DIAGNOSIS — Z79.4: ICD-10-CM
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
## 2020-12-29 DIAGNOSIS — S60.222A: ICD-10-CM

## 2020-12-29 NOTE — RAD
EXAM: Hand,Left 3 Views 



CLINICAL INDICATION: Left hand pain



COMPARISON: There is no previous study for comparison. 



FINDINGS:Three views of the left hand reveal no fracture. There

are no radiopaque foreign bodies. There is no dislocation. No

bony destruction is seen to suggest osteomyelitis. The osseous

structures are intact and unremarkable. 



IMPRESSION: Negative left hand radiographs. 



Electronically signed by:  Jhon Mendes MD  12/29/2020 7:24 PM

[facility] Workstation: [id number]

## 2020-12-29 NOTE — ED.PDOC
History of Present Illness





- General


Chief Complaint: General


Stated Complaint: Left Hand pain


Time Seen by Provider: 20 19:16


Source: patient, RN notes reviewed, Vital Signs reviewed, family - wife


Exam Limitations: no limitations





- History of Present Illness


Initial Comments: 





Patient is a 55-year-old white male who injured his left hand 2 days ago by 

striking it accidentally with a sledgehammer while working on a pipe under his 

foundation.  Patient complains of left fourth finger pain.  It is throbbing in 

nature.  It is severe in intensity.  It is constant.  There is no radiation of 

the pain.  The pain is worse with movement or palpation.  Rest and it seems to 

help slightly as does his narcotic pain medicine that he takes at home for his 

leg pain.


Associated Symptoms: denies symptoms


Allergies/Adverse Reactions: 


Allergies





Codeine Allergy (Severe, Verified 20 10:51)


   Anaphylaxis





Home Medications: 


Ambulatory Orders





Insulin Degludec [Tresiba Flextouch] 40 unit SC DAILY 19 


Sertraline HCl [Zoloft] 100 mg PO DAILY 19 


Apixaban [Eliquis] 5 mg PO BID 19 


Cyclobenzaprine HCl [Cyclobenzaprine Hydrochlo] 10 mg PO DAILY 19 


Insulin Lispro [Humalog Kwikpen] See Protocol INJ PRN 19 


Meloxicam 15 mg PO DAILY 19 


Lisinopril 5 mg PO DAILY 19 


Apixaban [Eliquis] 5 mg PO BID #39 tab 19 


Bifidobacterium Infantis [Align] 4 mg PO DAILY  cap 19 


Cefdinir 300 mg PO BID #24 capsule 19 


Doxycycline Hyclate 100 mg PO BID #24 cap 19 


Cole/Poly/Hc Otic Susp [Cortisporin Otic Susp] 4 drop LEFT_EAR Q6H #10 days 

20 


Cephalexin Monohydrate [Keflex] 500 mg PO QID #40 cap 20 


Sulfamethoxazole-Trimethoprim [Bactrim Ds 800-160 mg] 1 tab PO BID #20 tab 

20 











Review of Systems





- Review of Systems


Constitutional: States: no symptoms reported, see HPI.  Denies: chills, fever


EENTM: States: no symptoms reported.  Denies: eye pain, blurred vision, double 

vision


Respiratory: States: no symptoms reported.  Denies: cough, short of breath


Cardiology: States: no symptoms reported.  Denies: chest pain, palpitations, 

syncope


Gastrointestinal/Abdominal: States: no symptoms reported.  Denies: abdominal 

pain, nausea, vomiting


Genitourinary: States: no symptoms reported


Musculoskeletal: States: see HPI, joint pain, joint swelling


Skin: States: no symptoms reported.  Denies: change in color, rash


Neurological: States: no symptoms reported.  Denies: tingling, tremors, weakness


Endocrine: States: no symptoms reported


Hematologic/Lymphatic: States: no symptoms reported


All other Systems: Reviewed and Negative





Past Medical History (General)





- Patient Medical History


Hx Seizures: Yes


Hx Stroke: Yes


Hx Dementia: No


Hx Asthma: No


Hx of COPD: No


Hx Cardiac Disorders: Yes - MI


Hx Congestive Heart Failure: No


Hx Pacemaker: No


Hx Hypertension: No


Hx Thyroid Disease: No


Hx Diabetes: Yes


Hx Gastroesophageal Reflux: No


Hx Renal Disease: No


Hx Cancer: No


Hx of HIV: No


Hx Hepatitis C: No


Hx MRSA: No


Surgical History: other





- Vaccination History


Hx Tetanus, Diphtheria Vaccination: Yes


Hx Influenza Vaccination: No


Hx Pneumococcal Vaccination: No





- Social History


Hx Tobacco Use: No


Hx Chewing Tobacco Use: No


Hx Alcohol Use: Yes


Hx Substance Use: No


Hx Substance Use Treatment: No


Hx Depression: No


Feels Threatened In Home Enviroment: No


Feels Threatened In a Relationship: No


Hx Physical Abuse: No


Hx Emotional Abuse: No


Hx Suspected Abuse: No





- Female History


Patient is a Female of Child Bearing Age (10 -59 yrs old): No





- Triage Comment


ED Triage Comment: The patient walked into ER bed 3 and was placed into bed and 

left with rails up. He was alert and oriented times 4 and complained of left 

hand pain. He had noted swelling to the top of his left hand and pain upon 

movement of his left ring finger. He had no other noted complaints noted at the 

time of contact.





Family Medical History





- Family History


  ** Mother


Family History: Unknown


Living Status: Still Living


Hx Family Asthma: No


Hx Family Congestive Heart Failure: No


Hx Family Hypertension: No


Hx Family Stroke: No


Hx Cardiac Disease: No


Hx Family Diabetes: No


Hx Family Cancer: Yes





  ** Father


Living Status: 


Hx Family Asthma: No


Hx Family Congestive Heart Failure: No


Hx Family Hypertension: Yes


Hx Family Stroke: Yes


Hx Cardiac Disease: Yes


Hx Family Diabetes: Yes


Hx Family Cancer: Yes





Physical Exam





- Physical Exam


General Appearance: Alert, Comfortable, Unkempt, Well Developed, Well Hydrated, 

Well Nourished


Eye Exam: bilateral normal


Ears, Nose, Throat: hearing grossly normal, normal ENT inspection, normal 

pharynx


Neck: non-tender, full range of motion, supple


Respiratory: chest non-tender, lungs clear, normal breath sounds, no respiratory

distress, no accessory muscle use


Cardiovascular/Chest: normal peripheral pulses, regular rate, rhythm, no edema, 

no gallop, no JVD


Peripheral Pulses: radial,right: 2+, radial,left: 2+


Gastrointestinal/Abdominal: normal bowel sounds, non tender, soft


Back Exam: normal inspection, no CVA tenderness, no vertebral tenderness


Extremity: other - Mild swelling and tenderness to palpation of the left fourth 

finger.  Neurovascularly intact distally.  Cap refill less than 2 seconds.  No 

crepitus or step-offs felt.


Neurologic: CNs II-XII nml as tested, no motor/sensory deficits, alert, normal 

mood/affect, oriented x 3


Skin Exam: normal color, warm/dry


Lymphatic: no adenopathy





Progress





- Progress


Progress: 


Differential diagnosis: Hand fracture, hand contusion, tendon injury, hand 

sprain among others.








20 19:46


X-rays did not show any fracture.  Patient does have slightly reduced range of 

motion secondary to swelling and pain.  I believe patient has a hand contusion 

and finger contusion.  Plan discharge home with follow-up with his PCP.  I 

discussed the plan of care with the patient and his wife and they voiced 

understanding and agreement with plan of care.





Avel Hall M.D.


#[id number]














- Results/Orders


Results/Orders: 





EXAM: Hand,Left 3 Views  CLINICAL INDICATION: Left hand pain  COMPARISON: There 

is no previous study for comparison.  FINDINGS:Three views of the left hand 

reveal no fracture. There are no radiopaque foreign bodies. There is no 

dislocation. No bony destruction is seen to suggest osteomyelitis. The osseous 

structures are intact and unremarkable.  IMPRESSION: Negative left hand 

radiographs.  Electronically signed by: Randee Mendes MD 2020 7:24 PM


                                   Vital Signs











  20





  18:48


 


Temperature 97.5 F L


 


Pulse Rate [ 89





Pulse Ox] 


 


Respiratory 18





Rate 


 


Blood Pressure 152/96





[Left Arm] 


 


O2 Sat by Pulse 97





Oximetry 














Departure





- Departure


Clinical Impression: 


Hand contusion


Qualifiers:


 Encounter type: initial encounter Laterality: left Qualified Code(s): S60.222A 

- Contusion of left hand, initial encounter





Contusion, fingers


Qualifiers:


 Encounter type: initial encounter Finger: ring finger Damage to nail status: 

without damage Laterality: left Qualified Code(s): S60.042A - Contusion of left 

ring finger without damage to nail, initial encounter





Time of Disposition: 19:53


Disposition: Discharge to Home or Self Care


Condition: Good


Departure Forms:  ED Discharge - Pt. Copy, Patient Portal Self Enrollment


Instructions:  Contusion (DC)


Diet: resume usual diet


Activity: increase activity as tolerated


Referrals: 


RANDEE SCHAEFER IV, NP [Active Staff] - 1 Week


Home Medications: 


Ambulatory Orders





Insulin Degludec [Tresiba Flextouch] 40 unit SC DAILY 19 


Sertraline HCl [Zoloft] 100 mg PO DAILY 19 


Apixaban [Eliquis] 5 mg PO BID 19 


Cyclobenzaprine HCl [Cyclobenzaprine Hydrochlo] 10 mg PO DAILY 19 


Insulin Lispro [Humalog Kwikpen] See Protocol INJ PRN 19 


Meloxicam 15 mg PO DAILY 19 


Lisinopril 5 mg PO DAILY 19 


Apixaban [Eliquis] 5 mg PO BID #39 tab 19 


Bifidobacterium Infantis [Align] 4 mg PO DAILY  cap 19 


Cefdinir 300 mg PO BID #24 capsule 19 


Doxycycline Hyclate 100 mg PO BID #24 cap 19 


Cole/Poly/Hc Otic Susp [Cortisporin Otic Susp] 4 drop LEFT_EAR Q6H #10 days 

20 


Cephalexin Monohydrate [Keflex] 500 mg PO QID #40 cap 20 


Sulfamethoxazole-Trimethoprim [Bactrim Ds 800-160 mg] 1 tab PO BID #20 tab 

20